# Patient Record
Sex: FEMALE | Race: WHITE
[De-identification: names, ages, dates, MRNs, and addresses within clinical notes are randomized per-mention and may not be internally consistent; named-entity substitution may affect disease eponyms.]

---

## 2018-11-03 ENCOUNTER — HOSPITAL ENCOUNTER (OUTPATIENT)
Age: 58
End: 2018-11-03
Payer: SELF-PAY

## 2018-11-03 ENCOUNTER — HOSPITAL ENCOUNTER (OUTPATIENT)
Age: 58
End: 2018-11-03
Payer: COMMERCIAL

## 2018-11-03 DIAGNOSIS — E03.9: Primary | ICD-10-CM

## 2018-11-03 PROCEDURE — 84443 ASSAY THYROID STIM HORMONE: CPT

## 2020-03-02 ENCOUNTER — HOSPITAL ENCOUNTER (OUTPATIENT)
Age: 60
Discharge: HOME | End: 2020-03-02
Payer: COMMERCIAL

## 2020-03-02 DIAGNOSIS — E03.9: Primary | ICD-10-CM

## 2020-03-02 PROCEDURE — 84443 ASSAY THYROID STIM HORMONE: CPT

## 2021-05-19 ENCOUNTER — HOSPITAL ENCOUNTER (OUTPATIENT)
Age: 61
Discharge: HOME | End: 2021-05-19
Payer: COMMERCIAL

## 2021-05-19 DIAGNOSIS — E03.9: Primary | ICD-10-CM

## 2021-05-19 PROCEDURE — 84443 ASSAY THYROID STIM HORMONE: CPT

## 2022-06-01 ENCOUNTER — HOSPITAL ENCOUNTER (OUTPATIENT)
Age: 62
Discharge: HOME | End: 2022-06-01
Payer: COMMERCIAL

## 2022-06-01 DIAGNOSIS — E03.9: ICD-10-CM

## 2022-06-01 DIAGNOSIS — E11.9: Primary | ICD-10-CM

## 2022-06-01 LAB
ALANINE AMINOTRANSFER ALT/SGPT: 29 U/L (ref 13–56)
ALBUMIN SERPL-MCNC: 3.6 G/DL (ref 3.2–5)
ALKALINE PHOSPHATASE: 123 U/L (ref 45–117)
ANION GAP: 5 (ref 5–15)
AST(SGOT): 14 U/L (ref 15–37)
BUN SERPL-MCNC: 16 MG/DL (ref 7–18)
BUN/CREAT RATIO: 21.6 RATIO (ref 10–20)
CALCIUM SERPL-MCNC: 9.1 MG/DL (ref 8.5–10.1)
CARBON DIOXIDE: 32 MMOL/L (ref 21–32)
CHLORIDE: 98 MMOL/L (ref 98–107)
CHOLEST SERPL-MCNC: 212 MG/DL
DEPRECATED RDW RBC: 42.8 FL (ref 35.1–43.9)
ERYTHROCYTE [DISTWIDTH] IN BLOOD: 13.2 % (ref 11.6–14.6)
EST GLOM FILT RATE - AFR AMER: 103 ML/MIN (ref 60–?)
GLOBULIN: 3.8 G/DL (ref 2.2–4.2)
GLUCOSE: 369 MG/DL (ref 74–106)
HCT VFR BLD AUTO: 44.4 % (ref 37–47)
HEMOGLOBIN: 14.4 G/DL (ref 12–15)
HGB BLD-MCNC: 14.4 G/DL (ref 12–15)
IMMATURE GRANULOCYTES COUNT: 0.03 X10^3/UL (ref 0–0)
MCV RBC: 87.7 FL (ref 81–99)
MEAN CORP HGB CONC: 32.4 G/DL (ref 32–36)
MEAN PLATELET VOL.: 10.9 FL (ref 6.2–12)
NRBC FLAGGED BY ANALYZER: 0 % (ref 0–5)
PLATELET # BLD: 264 K/MM3 (ref 150–450)
PLATELET COUNT: 264 K/MM3 (ref 150–450)
POTASSIUM: 4.8 MMOL/L (ref 3.5–5.1)
RBC # BLD AUTO: 5.06 M/MM3 (ref 4.2–5.4)
RBC DISTRIBUTION WIDTH CV: 13.2 % (ref 11.6–14.6)
RBC DISTRIBUTION WIDTH SD: 42.8 FL (ref 35.1–43.9)
TRIGLYCERIDES: 163 MG/DL
VLDLC SERPL-MCNC: 33 MG/DL (ref 5–40)
WBC # BLD AUTO: 7.1 K/MM3 (ref 4.4–11)
WHITE BLOOD COUNT: 7.1 K/MM3 (ref 4.4–11)

## 2022-06-01 PROCEDURE — 80061 LIPID PANEL: CPT

## 2022-06-01 PROCEDURE — 82652 VIT D 1 25-DIHYDROXY: CPT

## 2022-06-01 PROCEDURE — 85025 COMPLETE CBC W/AUTO DIFF WBC: CPT

## 2022-06-01 PROCEDURE — 84443 ASSAY THYROID STIM HORMONE: CPT

## 2022-06-01 PROCEDURE — 83525 ASSAY OF INSULIN: CPT

## 2022-06-01 PROCEDURE — 80053 COMPREHEN METABOLIC PANEL: CPT

## 2022-06-05 LAB — 1,25(OH)2D3 SERPL-MCNC: 52.8 PG/ML (ref 19.9–79.3)

## 2022-06-10 ENCOUNTER — HOSPITAL ENCOUNTER (OUTPATIENT)
Age: 62
Discharge: HOME | End: 2022-06-10
Payer: COMMERCIAL

## 2022-06-10 DIAGNOSIS — E10.8: Primary | ICD-10-CM

## 2022-06-10 LAB
ALANINE AMINOTRANSFER ALT/SGPT: 40 U/L (ref 13–56)
ALBUMIN SERPL-MCNC: 3.4 G/DL (ref 3.2–5)
ALKALINE PHOSPHATASE: 99 U/L (ref 45–117)
ANION GAP: 5 (ref 5–15)
AST(SGOT): 25 U/L (ref 15–37)
BUN SERPL-MCNC: 26 MG/DL (ref 7–18)
BUN/CREAT RATIO: 48.4 RATIO (ref 10–20)
CALCIUM SERPL-MCNC: 9.5 MG/DL (ref 8.5–10.1)
CARBON DIOXIDE: 29 MMOL/L (ref 21–32)
CHLORIDE: 104 MMOL/L (ref 98–107)
DEPRECATED RDW RBC: 44.2 FL (ref 35.1–43.9)
ERYTHROCYTE [DISTWIDTH] IN BLOOD: 13.6 % (ref 11.6–14.6)
EST GLOM FILT RATE - AFR AMER: 149 ML/MIN (ref 60–?)
GLOBULIN: 3.7 G/DL (ref 2.2–4.2)
GLUCOSE: 171 MG/DL (ref 74–106)
HCT VFR BLD AUTO: 42.9 % (ref 37–47)
HEMOGLOBIN: 13.8 G/DL (ref 12–15)
HGB BLD-MCNC: 13.8 G/DL (ref 12–15)
IMMATURE GRANULOCYTES COUNT: 0.04 X10^3/UL (ref 0–0)
MCV RBC: 88.6 FL (ref 81–99)
MEAN CORP HGB CONC: 32.2 G/DL (ref 32–36)
MEAN PLATELET VOL.: 10.4 FL (ref 6.2–12)
NRBC FLAGGED BY ANALYZER: 0 % (ref 0–5)
PLATELET # BLD: 319 K/MM3 (ref 150–450)
PLATELET COUNT: 319 K/MM3 (ref 150–450)
POTASSIUM: 4.3 MMOL/L (ref 3.5–5.1)
RBC # BLD AUTO: 4.84 M/MM3 (ref 4.2–5.4)
RBC DISTRIBUTION WIDTH CV: 13.6 % (ref 11.6–14.6)
RBC DISTRIBUTION WIDTH SD: 44.2 FL (ref 35.1–43.9)
WBC # BLD AUTO: 7.7 K/MM3 (ref 4.4–11)
WHITE BLOOD COUNT: 7.7 K/MM3 (ref 4.4–11)

## 2022-06-10 PROCEDURE — 83036 HEMOGLOBIN GLYCOSYLATED A1C: CPT

## 2022-06-10 PROCEDURE — 85025 COMPLETE CBC W/AUTO DIFF WBC: CPT

## 2022-06-10 PROCEDURE — 80053 COMPREHEN METABOLIC PANEL: CPT

## 2022-07-21 ENCOUNTER — OFFICE VISIT (OUTPATIENT)
Dept: ENDOCRINOLOGY | Age: 62
End: 2022-07-21
Payer: COMMERCIAL

## 2022-07-21 VITALS
BODY MASS INDEX: 27.31 KG/M2 | SYSTOLIC BLOOD PRESSURE: 143 MMHG | OXYGEN SATURATION: 98 % | DIASTOLIC BLOOD PRESSURE: 87 MMHG | HEIGHT: 67 IN | HEART RATE: 67 BPM | WEIGHT: 174 LBS

## 2022-07-21 DIAGNOSIS — R53.83 FATIGUE, UNSPECIFIED TYPE: ICD-10-CM

## 2022-07-21 LAB
ANION GAP SERPL CALCULATED.3IONS-SCNC: 11 MEQ/L (ref 9–15)
BUN BLDV-MCNC: 23 MG/DL (ref 8–23)
C-PEPTIDE: 1.8 NG/ML (ref 1.1–4.4)
CALCIUM SERPL-MCNC: 9.8 MG/DL (ref 8.5–9.9)
CHLORIDE BLD-SCNC: 100 MEQ/L (ref 95–107)
CHP ED QC CHECK: NORMAL
CO2: 28 MEQ/L (ref 20–31)
CORTISOL COLLECTION INFO: NORMAL
CORTISOL: 10.6 UG/DL (ref 2.7–18.4)
CREAT SERPL-MCNC: 0.42 MG/DL (ref 0.5–0.9)
FOLATE: 15.1 NG/ML
GFR AFRICAN AMERICAN: >60
GFR NON-AFRICAN AMERICAN: >60
GLUCOSE BLD-MCNC: 120 MG/DL (ref 70–99)
GLUCOSE BLD-MCNC: 133 MG/DL
HBA1C MFR BLD: 10.3 %
POTASSIUM SERPL-SCNC: 4.2 MEQ/L (ref 3.4–4.9)
SODIUM BLD-SCNC: 139 MEQ/L (ref 135–144)
VITAMIN B-12: 532 PG/ML (ref 232–1245)

## 2022-07-21 PROCEDURE — 99203 OFFICE O/P NEW LOW 30 MIN: CPT | Performed by: INTERNAL MEDICINE

## 2022-07-21 PROCEDURE — 83036 HEMOGLOBIN GLYCOSYLATED A1C: CPT | Performed by: INTERNAL MEDICINE

## 2022-07-21 PROCEDURE — 82962 GLUCOSE BLOOD TEST: CPT | Performed by: INTERNAL MEDICINE

## 2022-07-21 RX ORDER — FLASH GLUCOSE SENSOR
KIT MISCELLANEOUS
COMMUNITY
Start: 2022-06-27

## 2022-07-21 RX ORDER — LEVOTHYROXINE SODIUM 175 MCG
TABLET ORAL
COMMUNITY
Start: 2022-06-10

## 2022-07-21 RX ORDER — INSULIN GLARGINE 100 [IU]/ML
27 INJECTION, SOLUTION SUBCUTANEOUS NIGHTLY
COMMUNITY
Start: 2022-06-16

## 2022-07-21 RX ORDER — SPIRONOLACTONE 100 MG/1
TABLET, FILM COATED ORAL
COMMUNITY
Start: 2022-07-01

## 2022-07-21 RX ORDER — BLOOD-GLUCOSE METER
EACH MISCELLANEOUS
COMMUNITY
Start: 2022-06-13

## 2022-07-21 RX ORDER — PEN NEEDLE, DIABETIC 32GX 5/32"
NEEDLE, DISPOSABLE MISCELLANEOUS
COMMUNITY
Start: 2022-06-10

## 2022-07-21 RX ORDER — BLOOD SUGAR DIAGNOSTIC
STRIP MISCELLANEOUS
COMMUNITY
Start: 2022-06-18

## 2022-07-21 ASSESSMENT — ENCOUNTER SYMPTOMS: VISUAL CHANGE: 1

## 2022-07-21 NOTE — PROGRESS NOTES
7/21/2022    Assessment:       Diagnosis Orders   1. DM w/ coma type I, uncontrolled ([facility] [address]        2. Fatigue, unspecified type              PLAN:     Orders Placed This Encounter   Procedures    Basic Metabolic Panel     Standing Status:   Future     Number of Occurrences:   1     Standing Expiration Date:   7/21/2023    C-Peptide     Standing Status:   Future     Number of Occurrences:   1     Standing Expiration Date:   7/21/2023    Glutamic Acid Decarboxylase     Standing Status:   Future     Number of Occurrences:   1     Standing Expiration Date:   7/21/2023    Anti-Islet Cell Antibody     Standing Status:   Future     Number of Occurrences:   1     Standing Expiration Date:   7/21/2023    Cortisol Total     Standing Status:   Future     Number of Occurrences:   1     Standing Expiration Date:   7/21/2023     Order Specific Question:   8AM or 4PM?     Answer:   random    Vitamin B12 & Folate     Standing Status:   Future     Number of Occurrences:   1     Standing Expiration Date:   7/21/2023    [facility] Diabetic Education, Ivonne     Referral Priority:   Routine     Referral Type:   Eval and Treat     Referral Reason:   Specialty Services Required     Number of Visits Requested:   1    POCT Glucose    POCT glycosylated hemoglobin (Hb A1C)     Extensive diabetes education was provided patient advised to continue using freestyle maria elena test ordered for type 1 diabetes advised to increase carb intake referral to dietitian nutritionist more than 50% of 40-minute spent patient education counseling  Diabetes education provided today:  Continue current of Lantus might need fast acting insulin later  Follow-up in 4 weeks  Nutrition as a mainstream of diabetes therapy. [place] about label reading. Continuous Glucose monitor. How it works and checks blood sugars every 5 min. for 4 days during our tests. Different diabetic medications. Managing high and low sugar readings.         Orders Placed This Encounter   Procedures POCT Glucose    POCT glycosylated hemoglobin (Hb A1C)     No orders of the defined types were placed in this encounter. No follow-ups on file. Subjective:     Chief Complaint   Patient presents with    New Patient     Diabetes     Vitals:    07/21/22 0859 07/21/22 0910   BP: (!) 153/86 (!) 143/87   Site: Left Upper Arm Right Lower Arm   Position: Sitting Sitting   Cuff Size: Medium Adult Medium Adult   Pulse: 67    SpO2: 98%    Weight: 174 lb (78.9 kg)    Height: 5' 7\" (1.702 m)      Wt Readings from Last 3 Encounters:   07/21/22 174 lb (78.9 kg)     BP Readings from Last 3 Encounters:   07/21/22 (!) 143/87     Patient referred here for a new diagnosis of diabetes probably type I patient was started on insulin and oral medications were tried without any improvement in her blood sugars patient A1c was more than 12 blood sugars were initially high in the 2 300 range currently they are running in the 100 range using freestyle maria elena continuous glucose monitoring patient has eliminated carbs in her diet is does complain of fatigue also was found to have retinopathy initially patient thought she had cataracts is getting treatment for retinopathy with injections after insulin start patient has gained weight and has noted some swelling in her legs currently on Lantus 27 units twice a day   Reviewed freestyle maria elena scanning more than 20 times a day average blood sugar was 1 30-1 40 range denies any hypoglycemia    History of hypothyroidism on Synthroid 175 mcg daily thyroid function test normal as per patient    Diabetes  She presents for her initial diabetic visit. She has type 1 diabetes mellitus. The initial diagnosis of diabetes was made 2 months ago. Her disease course has been improving. There are no hypoglycemic associated symptoms. Associated symptoms include fatigue and visual change. Pertinent negatives for diabetes include no weight loss. There are no hypoglycemic complications. Symptoms are improving. Diabetic complications include retinopathy. Current diabetic treatment includes insulin injections. She is currently taking insulin pre-breakfast and at bedtime. She participates in exercise intermittently. Her overall blood glucose range is >200 mg/dl. (Lab Results       Component                Value               Date                       LABA1C                   10.3                07/21/2022                )   No past medical history on file. No past surgical history on file. Social History     Socioeconomic History    Marital status:      Spouse name: Not on file    Number of children: Not on file    Years of education: Not on file    Highest education level: Not on file   Occupational History    Not on file   Tobacco Use    Smoking status: Never     Passive exposure: Never    Smokeless tobacco: Never   Substance and Sexual Activity    Alcohol use: Never    Drug use: Never    Sexual activity: Not on file   Other Topics Concern    Not on file   Social History Narrative    Not on file     Social Determinants of Health     Financial Resource Strain: Not on file   Food Insecurity: Not on file   Transportation Needs: Not on file   Physical Activity: Not on file   Stress: Not on file   Social Connections: Not on file   Intimate Partner Violence: Not on file   Housing Stability: Not on file     No family history on file.   Allergies   Allergen Reactions    Cephalexin Hives       Current Outpatient Medications:     Blood Glucose Monitoring Suppl (ONE TOUCH ULTRA 2) w/Device KIT, As directed, Disp: , Rfl:     Continuous Blood Gluc Sensor (FREESTYLE ELI 2 SENSOR) MISC, use to check BLOOD SUGAR AS DIRECTED, Disp: , Rfl:     ONETOUCH ULTRA strip, test as directed FOUR TIMES DAILY, Disp: , Rfl:     LANTUS SOLOSTAR 100 UNIT/ML injection pen, inject 27 units subcutaneously twice a day, Disp: , Rfl:     [facility] UNIFINE PENTIPS PLUS 32G X 4 MM MISC, USE AS DIRECTED, Disp: , Rfl:     SYNTHROID 175 MCG tablet, TAKE Mental Status: She is alert and oriented to person, place, and time.    Psychiatric:         Mood and Affect: Mood normal.         Behavior: Behavior normal.

## 2022-07-24 LAB — ISLET CELL ANTIBODY: ABNORMAL

## 2022-07-26 LAB — GLUTAMIC ACID DECARB AB: >250 IU/ML (ref 0–5)

## 2022-07-29 ENCOUNTER — TELEPHONE (OUTPATIENT)
Dept: ENDOCRINOLOGY | Age: 62
End: 2022-07-29

## 2022-07-29 NOTE — TELEPHONE ENCOUNTER
Test for the cortisol was normal  Test for insulin antibodies we positive going along with type 1 diabetes  G13 folic acid normal

## 2022-08-05 ENCOUNTER — HOSPITAL ENCOUNTER (OUTPATIENT)
Dept: DIABETES SERVICES | Age: 62
Setting detail: THERAPIES SERIES
Discharge: HOME | End: 2022-08-05
Payer: COMMERCIAL

## 2022-08-05 PROCEDURE — G0108 DIAB MANAGE TRN  PER INDIV: HCPCS

## 2022-08-05 SDOH — ECONOMIC STABILITY: FOOD INSECURITY: ADDITIONAL INFORMATION: NO

## 2022-08-05 ASSESSMENT — PROBLEM AREAS IN DIABETES QUESTIONNAIRE (PAID)
COPING WITH COMPLICATIONS OF DIABETES: 2
FEELING DEPRESSED WHEN YOU THINK ABOUT LIVING WITH DIABETES: 0
PAID-5 TOTAL SCORE: 5
WORRYING ABOUT THE FUTURE AND THE POSSIBILITY OF SERIOUS COMPLICATIONS: 3
FEELING THAT DIABETES IS TAKING UP TOO MUCH OF YOUR MENTAL AND PHYSICAL ENERGY EVERY DAY: 0
FEELING SCARED WHEN YOU THINK ABOUT LIVING WITH DIABETES: 0

## 2022-08-05 ASSESSMENT — SLEEP AND FATIGUE QUESTIONNAIRES
HOW DO YOU RATE THE QUALITY OF YOUR SLEEP: POOR
HOW MANY HOURS OF SLEEP ARE YOU GETTING, ON AVERAGE: LESS THAN 7
HAVE YOU EVER BEEN TESTED FOR SLEEP APNEA: NO
HAVE YOU BEEN TOLD, OR NOTICED ON YOUR OWN, THAT YOU STOP BREATHING OR STRUGGLE TO BREATHE IN YOUR SLEEP: NO

## 2022-08-05 NOTE — PROGRESS NOTES
Diabetes Self- Management Education Program Assessment and Education Record-   Also see Diabetic Screening    Patient, Luis Patel, has been diagnosed with  [] Type 1 [x] Type 2 diabetes. [x] Patient is new to diabetes, and here for initial diabetes self-management assessment and education. [] Patient is here for review of diabetes self-management assessment and education. Today's visit was in an [x] individual [] group setting. Patient came [] alone [x] with family member.  Mich Conde   Goals setting:  Initial Goal Set with Patient  [x]Yes  [] NO      MEDICAL HISTORY:  No past medical history on file. Family History   Problem Relation Age of Onset    Diabetes Mother     Diabetes Father     Diabetes Brother     Diabetes Maternal Grandmother     Diabetes Maternal Grandfather      Cephalexin     There is no immunization history on file for this patient. Current Medications  Current Outpatient Medications   Medication Sig Dispense Refill    Blood Glucose Monitoring Suppl (ONE TOUCH ULTRA 2) w/Device KIT As directed      Continuous Blood Gluc Sensor (FREESTYLE ELI 2 SENSOR) MISC use to check BLOOD SUGAR AS DIRECTED      ONETOUCH ULTRA strip test as directed FOUR TIMES DAILY      LANTUS SOLOSTAR 100 UNIT/ML injection pen Inject 27 Units into the skin nightly      DRUG MART UNIFINE PENTIPS PLUS 32G X 4 MM MISC USE AS DIRECTED      SYNTHROID 175 MCG tablet TAKE 1 TABLET BY MOUTH DAILY      spironolactone (ALDACTONE) 100 MG tablet TAKE 1 TABLET BY MOUTH DAILY       No current facility-administered medications for this encounter.   :     Comments:  Allergies: Allergies   Allergen Reactions    Cephalexin Hives       Diabetes 5  / Health Status    1. A1C blood level - at goal < 7%   Lab Results   Component Value Date    LABA1C 10.3 07/21/2022     Lab Results   Component Value Date    CREATININE 0.42 (L) 07/21/2022       2.  Blood pressure (140/90)  Or less  BP Readings from Last 3 Encounters:   07/21/22 (!) 143/87       3. Cholesterol ( LDL under  100)   No results found for: LDLCALC, LDLCHOLESTEROL, LDLDIRECT    4. Smoking ? []Yes   [x]No    5. Taking an Asprin daily? []Yes   [x]No      Diabetes Self- Management Education Record    Participant Name: Angie Mohamud  Referring Provider: Rachael Sandra   Assessment/Evaluation Ratings:  1=Needs Instruction   4=Demonstrates Understanding/Competency  2=Needs Review   NC=Not Covered    3=Comprehends Key Points  N/A=Not Applicable    Topics/Learning Objectives Initial Assessment Date:  Comments:  Signature:   Classes 1, 2, 3 or Individual instruction Instr. Date:  Comment:   Signature:   Reinforce Date:  Comments:  Signature: Post- Education Eval date:  Comments:  Signature:   Pathophysiology of diabetes  Define diabetes & Insulin resistance Identify own type of diabetes; role of the pancreas; signs/symptoms; diagnostic criteria; prevention & treatment options; contributing factors. Assessment Ratin22  Diabetes Education assessment completed today. Patient has:  [] No knowledge of diabetes disease process   [x] Limited knowledge of diabetes disease process  [] Good knowledge of diabetes disease process. In this session, the role of the pancreas, insulin, and the liver in glucose utilization and insulin resistance was   [x]  Introduced  []  Reviewed. [x] ADA booklet Where Do I Begin used to reinforce teaching. Aneesh Stevens RN   [] Discussed basic pathophysiology of diabetes with the group including the pancreas, insulin, insulin resistance and the liver's involvement. [] Reviewed the different types of DM, risk factors, diagnosis, symptoms and the treatment options. Evaluation rating:  Recent Health problems:  []Yes []No   Being Active  Verbalize effect of exercise on blood glucose levels; benefits of regular exercise; safety considerations; contraindications; maintenance of activity.    Assessment Ratin22    Patient is   [x] American Diabetes Association goals for blood glucose, effects of diet, exercise and medications on test results, frequency of testing, the possible causes and appropriate action to take if hypoglycemia (15/15 rule) or hyperglycemia occurs,   importance of record keeping to share with their PCP and when to call their PCP with abnormal results. [] Also reviewed were: proper storage and handling of both the meter and test strips; proper disposal of used strips and lancets, running  checks on the test strips using control solution and loading and using lancet device to obtain an adequate sample. Suggested times were given for routine testing. To increase testing for sick day monitoring, or when a change in diabetes medication, activity, or stress occurs. []  Aware of individualized A1C goal and current A1C. [] Checking for ketones was introduced along with prevention and symptoms of Diabetic Ketoacidosis (DKA)    Evaluation rating:    Checking blood sugar    times a day. Checking before meals? []Yes  []No     Fasting ranges in last week:      Checking 2 hours post prandial? []Yes  []No     Ranges in last week:     Checking at bedtime? []Yes  []No   Ranges in last week:     Knowledgeable of blood glucose goals? []Yes []No             Glucose meter - educate on meter use if new to monitoring.  Able to use meter appropriately   Assessment Rating: 3  08/05/22   Patient has Freestyle Donato CGM  [] Patient is new to glucose meter and instructed on the following.:   []Overview of meter - 800 number on all machines for help  []Proper storage/ handling of meter and test strips  []Washing hands, turning on meter - setting date and time  []Running  checks on the test strips using control solution   []Loading and using lancet device to obtain an adequate sample  []Obtaining blood sample and reading results on meter  []Proper disposal of used strips and lancets  []Frequency of testing  Importance of record keeping   []When to call their PCM with abnormal results  [x]Desired blood glucose goals for optimal control - handout given  [] All of the above    [] Patient instructed on home meter. Name of meter:    [] Patient instructed with demonstrator model in office. The patient return demonstrated   [] Verbally   [] Using his own meter:        [] Self-tested Bg:_____    Triston Goldberg RN   See above  Evaluation rating:    Cleaning hands before testing? []Yes   []No    Using sides of fingers, not pads? []Yes   []No    Using  checks. []Yes   []No    Logging resuts? []Yes   []No   Risk Reduction - acute complications:  Identify symptoms of hyper & hypoglycemia, and prevention & treatment strategies. Assessment Ratin  22  [] Knowledgeable  [x] Limited Knowledge  [] No knowledge   of hypo or hyperglycemia. [x] Handouts reviewed covering symptoms and treatment for both. (includes Rule of 15)    Patient has [x]low []high risk for hypoglycemia. [x] Advised to carry source of fast acting glucose at all times. []  Advised to carry ID on person identifying as having diabetes  Triston Goldberg RN   [] Reviewed signs and symptoms of acute complications of diabetes, (hypoglycemia and hyperglycemia), possible causes and actions to take if occurs. [] Reviewed BG guidelines and troubleshooting unexpected numbers. Evaluation rating:    Knowledge of Hypo and Hyper glycemia treatment []Yes []No     Knowledge of Hypo and Hyper glycemia prevention []Yes []No    Lows since last visit? []Yes []No      Treat appropriately? []Yes  []No    Explainable? []Yes  []No      Ketone testing? []Yes []No   Risk Reduction - sick days   Describe sick day guidelines & indications for physician notification. Identify short term consequences of poor control. Assessment Ratin22  NC   [] Advised of effects of illness on blood glucose.  Reviewed management of sick days with group. Advised about importance of continuing diabetes medications, tips for staying hydrated and when to call the doctor. [] Sick day handout given.     [] Sick day toolbox reviewed. Evaluation rating:    Knowledge of sick day plan? []Yes [] No   Healthy Coping:   Identify healthy and unhealthy coping, causes of stress and ways to reduce stress. How depression affects diabetes care and how to seek help if needed. Identify support needed & support network available   Assessment Ratin  22    Patient's PAID-5 Score5    [x]Patient's PAID-5 (Problem Areas in Diabetes) score is less than 9. No intervention needed at this time. [] Patient's PAID-5 (Problem Areas in Diabetes) score is greater than 8 which indicates possible diabetes related emotional distress and warrants further assessment. [] Support given during appointment. Patient advised to follow up with PCP or psychologist.   [] Letter will be sent to PCP indicating further assessment needed. Erich Carballo RN   [] Reviewed healthy coping and unhealthy coping with the group. Discussed what ways of coping each person usually uses and brainstormed ways to change to a healthy coping mechanism with the group. [] Stressed the importance of stress reduction and the negative effects of stress on the body including elevated BG.     [] Community resources and support networks reviewed and handout provided. Evaluation rating:    Feelings/Attitudes/Concerns? Ongoing self-management support plan? [] Yes []No   Problem solving & goal setting:  Behavior Change and goal settingIdentify 7 self-care behaviors, problem solving techniques: Finding problems, identifying solutions and taking action. Assessment Ratin  22  Discussed willingness to change behaviors and setting SMART goals. Patient [x] is willing  [] is not willing to change at this time.    Erich Carballo RN [] Identified problem solving techniques: finding problems, identifying solutions (goal setting) and taking action. [] Patient reviewed selected goal set at initial assessment. Evaluation rating:    Identifying Barriers: Depression, unhealthy behaviors, financial    Healthy Eating: Describe effect of type, amount & timing of food on blood glucose; Describe basic meal planning techniques & current nutrition guideline  Correctly read food labels & demonstrate CHO counting & portion control with personalized meal plan. Identifies dining out strategies, & dietary sick day guidelines   Assessment Ratin  22    Meal Plan patient is currently following:  [] plate method  [] portion control  [] carb counting   [] label reading  [x] no meal plan    [x] Booklet from ADA Counting Carbs given. Reviewed plate method, portion control & label reading for carb counting utilizing booklet. [x] Advised that dietitian will recommend individualized number of carbs to eat daily, but in meantime could follow basic recommendations as follows:  [] Men-   [] for weight loss - 3-4 choices/45-60 gms per meal with 1 snack of 15 gm per day. []To maintain weight: 4-5 choices/60-75  gms pre meal with 1 snack of 15 gm per day. [x] Women -   [x]weight loss 2-3 choices/30-45 gms per meal with 1 snack of 15 gm per day. []To maintain weight: 3-4 servings/45-60 gms pre meal with 1 snack of 15 gm per day. Patient would benefit from   [] individual appt with dietitian  [x] group MNT with dietitian    Erich Carballo RN   Evaluation rating:    Are you following a meal plan? []Yes []No    [] Portion control   [] Plate method   [] Carb counting   [] Label reading    Weight loss since class? []Yes []No   Taking MedicationsIdentify effects of diabetes medicines on blood glucose levels; List diabetes medication taken, action & side effects   Assessment Ratin  22  Handouts provided on both oral and injectable medications.  Currently taking: Lantus    [] Currently takes the following complementary or alternative medicines:    [] Reviewed medication compliance, action, side effects and timing of each. Patient is:  [x]compliant with current meds. []noncompliant with current meds. Vonne Aschoff, RN   [] Discussed all medication classes both oral and injectable to include method of action, side effects and precautions. [] Patient provided handout with their medications for diabetes listed showing method of action and when to take. Evaluation rating:    Any Changes in Medications? Compliant? []Yes []No     Any side effects? []Yes [] No   Insulin / Injectable - Appropriate injection sites; proper storage; supplies needed; proper technique; safe needle disposal guidelines. Assessment Ratin  22    [] Not on insulin    [] New to insulin   Patient is new to insulin and prescribed:     []  Instructed today on type of insulin(s), onset, peak and duration. []  Demonstrated proper administration technique using       []Vial & syringe       []Pen. [] Return demonstration via          [] Self-injection  __U Site:____             [] Demonstrator        []  Reviewed recommended injection sites, proper storage of insulin, Proper needle disposal, importance of blood glucose monitoring when taking insulin, and risk of hypoglycemia. []  Handouts given.     [x] Previously on insulin:  and assessed the following:    [x] Knowledge of type of insulin - onset, peak and duration of each type    [] Demonstrates Competency      [x] Education provided      [x] Proper storage of insulin:     [] Demonstrates Competency      [x] Education provided          [x] Site Management        [] Demonstrates Competency        [x] Education provided      [x] Injection site currently uses: arms    [] Evidence of:        [] bruising       [] infection       [] lipoatrophy        [] lipohypertrophy.      [] Injecting near umbilicus or scars/tattoos    [] Rotates sites appropriately    [] Skin Preparation technique:          [x] Injection Procedure:       [] Demonstrates Competency        [x] Education provided   [] Sterile Technique   [] Rolling to uniformity (if necessary)   [x] Pre-injection priming (pen only)   [] Air into vial (Syringe only)   [] Correct order for mixing in same syringe (if necessary)   [] Dosing accuracy   [] Needle insertion   [] Complete injection -    [] Needle withdrawal - with waiting time before removing needle          [x] Disposal Procedure:       [] Demonstrates Competency        [x] Education provided    [] Has sharps container /needle clip    [] Uses sharps container / needle clip          [] Injection Device Selection:       [] Demonstrates Competency       [] Education provided    [] Appropriate needle size    [] Sufficient volume    [] Appropriate dosing increments    [] Suited to physical limitations           [] Injection adherence:       [x] Demonstrates Competency        [] Education provided     [] Misses doses:         [] Daily [] Weekly          [] Monthly [] Almost Never         [x] Hypoglycemia symptoms & treatment:      [] Demonstrates Competency        [x] Education provided    Angelina Chávez RN   [] Discussed insulin stigma and proper technique including site selection and self-injection. []  Reviewed both pen and vial/syringe use. Discussed needle disposal and proper insulin storage and importance of times to take insulin. [] Discussed importance of blood glucose monitoring to assess current treatment effectiveness. Evaluation rating: On Insulin? []Yes []No           Injection site used: __________     Rotating sites? []Yes [] No     Problems? []Yes []No      Storing insulin correctly? []Yes [] No     Injecting at proper times? []Yes []No   IRisk Reduction - chronic complications:  Describe the relationship of blood glucose levels to long term complications of diabetes.  Identify preventative measures & standards of care. Assessment Ratin22  NC  See Diabetes Assessment for last completed chronic complication prevention appointments. [] Patient is up-to-date on preventative exams and vaccines  [x] Patient is not up-to date on preventative exams and vaccines and advised to discuss with PCM    [] Blood pressure is at goal  [x] Blood pressure is not at goal    [] Cholesterol is at goal  [] Cholesterol is not at goal    [x] Has microvascular complications  [] Has macrovascular complications  Erich Carballo RN   [] Reviewed natural course of T2DM with group and how chronic complications are related to elevated blood glucose. [] Discussed macro and microvascular complications and the need for control of lipids, blood pressure, glucose levels, weight reduction and smoking cessation to help reduce risks. [] Instructed on goals for blood pressure, lipids, and glucose for optimal health. [] Individualized scorecard provided with current   health maintenance recommendations from the American Diabetes Association to include Eye, dental exams, foot exams, recommended labs and vaccines for people with diabetes. Evaluation rating:    Using Score card? [] Yes []No    Blood Pressure at goal?  [] Yes []No    Foot exams:  self-exams daily  []Yes []No  Provider yearly   []  Yes []No               Eye and dental exams up to date? []Yes []No               Labs completed & at goal  [] Yes []No        Plan if not at goal? []Yes []No  Immunizations   [] Flu   []pneumonia   []Hep B (19-59)   []Covid   Individualized goal selection. 22  My goal , to help me improve my health, I will: 1. Include carbs with every meal      2.     Erich Carballo RN Percentage of goal completed from Initial Assessment:   %      2.    %    New revised goal: Percentage of goal completed:  1.      2.    New revised goal: Percentage of goal completed since end of class:  1.      2.    New revised goal:     Plan  Follow-up Appointments planned via phone call 9/1        Instruction Method: [x]Lecture/Discussion  []Power Point Presentation  [x]Handouts  []Return Demonstration      Education Materials/Equipment Provided:      [x] Self-Management  - Initial Assessment - Where do I Begin booklet and Counting Carbs from the ADA. [] Self-Management  Class 1 - On the Road to better managing your diabetes - Packet of Handouts from Diabetes Department    [] Self-Management  Class 2 - Meal Plan,  Choose your Foods - Food Lists for [employer] and Nutrition in the [employer] - fast facts about fast food    [] Self-Management  Class 3 -  Diabetes ID card,  foot care tips sheet,  Continuing Your Journey with Diabetes, Individualized Diabetes report card, Sick Day Rules, Diabetes Cookbooks, Magazines and Pedometers when available    [] Glucose Meter     [] BD Getting Started Insulin Kit     [] Other      Encounter Type Date Start Time End Time Comments No Show Dates   Assessment 08/05/22  Nasir Kahn RN 9am 1030 08/05/22    [] Patient has no barriers to learning and recommend attending classes. Classes scheduled for:     [] Patient has the following barriers to learning and would benefit from additional education in an individual setting. Barriers:      [] Will follow up:    [x] Patient declines classes at this time. [x] Will follow up:9/1 class schedule given    Nasir Kahn RN    Class 1 - Understanding diabetes      [] First class completed today. Class 2- Nutrition and diabetes        [] Second class completed today    Class 3 - Preventing Complications     [] Third class completed today. [] Patient has completed all 3 classes today. Goal sheets and DSMS Support Plan completed. Will follow up in 3-4 months via telephone. Patient advised to contact Diabetes Education office if any questions. Number provided. [] Patient needs to attend Class #    in order to complete classes. Scheduled for:        Individual MNT 3 Month Follow-up      []In Person  []Telephone    Meter Instrx        Insulin Instrx      []Pen  []Vial & Syringe      DSMS Support Plan:  Follow-up plan:    [] Healthy Coping  [] Emotional Support  [] National [place] on Mental Illness (HORACIO) - (Depression, bipolar and other support) 958.914.1554; www.horacio.org    [] [address] [place] [address]  633.115.7657; www.dbsalliance. org                          [] National Suicide Prevention Bifpbfqe-242-339-8255                          [] Anxiety & Depression Association of [place]                               Find local support groups by zip code at [facility] number [phone]                          [] Counseling:  ______________________________  [] Diabetes Support Groups  [] New Weigh of Life at [facility]  2nd Tuesday of the month at 10:00 [phone]  [] On-line support groups ([employer], [employer]us, ADA)  [] [facility]  [] I would be interested in a support group at [facility] in [facility] if offered    [] Stress Relief     [] Yoga Class     [] Start a journal     [] Relaxation techniques     [] Fekcoiu9Brrnf- Mery         [] SparkQuote (Free, inspiring quote of the day)    []  Healthy Eating  [] Weight Management & Carb Counting  [] Weight Kkxjtgzf-723-899-6000; www.weightwatchBuddyBet. [employer]  [] Over Eaters Vqxekrobi-771-234-2664 (support group)- www.oa. org  [] Follow up individual appointment with [facility] dietitian - call [phone]  [] Food Tracking Apps - My Fitness Parish Lin  [] ADA website - [address], weight loss  [] Help Me Rent Magazineplate. com      []   Monitoring  [] Glucose Surinder (Free, tracks blood glucose, graphs)  [] MySugrApp (Basic and Pro patterns)  [] Diabetes:M - logbook    []  Being Active  [] 24 Hour Uzmtkex-320-007-0240; www.[employer]  [] Shelton Glover. [employer]s  [] [facility] - [phone]  [] Fit Walks: FREE  [facility] in [place] on Mondays 5:30 pm  [facility] in [place] (for weather)   [place] on Thursdays at 5:30 [phone]  [] [place] walking videos (Some free on youtube)  [] Other Exercise Plan/Facility _____________________________________________  [] Eliud Danielle to 5K    []  Reducing Risk  [] Make a sick day toolkit  [] Schedule appointments for eye, dentist  [] Stop smoking              [] Monitor Blood pressure              [] Get vaccinated                [] Other:___________________      []    Taking Medications  [] Mery: MyTherapyApp - helps track meds  [] Seek financial help with medications:  [] Non-insulin-agents-cost-saving-resource-7-29-19. pdf   [employer]. org/docs/default-source/practice/educator-tools/non-insulin-agents-cost-saving-resource-7-29-19. pdf?sfvrsn=2  [] Insulin cost saving resource  [employer]. org/docs/default-source/practice/educator-tools/insulin-cost-saving-resources-3-4-19. pdf?sfvrsn=4  []  GetInsulin. org  []  The Affordable insulin Project http://affordableinsulinproject.org/    []  Diabetes Websites - Use as a resource for additional information  [] www.diabetes. org  [] My.[employer]. [employer]  [] WWW.diabeteseducator. org     Association of Diabetes Care & Education Specialists  [] www.niddk.nih.gov/health-information/diabetes/overview  Professor Lin [address]  [] www.medicalert. org  - offers emergency medical information service, including an ID bracelet/pendant (have to pay)    []   Journals/Magazines  [] Diabetes Forecast 1-745.640.3276; www.diabetesforecast.org  [] Diabetes Self-Management 1-341.169.6087; www.diabetesselfmanagement. com  [] Diabetes Health 764-414-6200; www.diabeteshealth. com    []   Other  [] Health Program offered at place of employment   [] Insurance Company's Chronic Disease Management Program  [] Follow up - Diabetes Education or Nutrition Therapy Annually (call in 1 year to set up apt)  [] Join the Living with Type 2 Diabetes Program (FREE)   www.diabetes. org/diabetes/type-2/living-with-type-2-diabetes-program  or call 1-800-DIABETES  [] Stop smoking - www.cancer. org/healthy/stay-away-from-tobacco       Post Education Referrals:        [] [place] Tobacco Quit information sheet and [address] [facility] , [address] [facility]      [] Dental care     [] Podiatrist     [] Ophthalmologist     [] Other    Harley Fan RN

## 2022-09-01 ENCOUNTER — TELEPHONE (OUTPATIENT)
Dept: DIABETES SERVICES | Age: 62
End: 2022-09-01